# Patient Record
Sex: MALE | Race: WHITE | NOT HISPANIC OR LATINO | URBAN - METROPOLITAN AREA
[De-identification: names, ages, dates, MRNs, and addresses within clinical notes are randomized per-mention and may not be internally consistent; named-entity substitution may affect disease eponyms.]

---

## 2018-12-09 ENCOUNTER — PATIENT OUTREACH (OUTPATIENT)
Dept: HEALTH INFORMATION MANAGEMENT | Facility: OTHER | Age: 68
End: 2018-12-09

## 2018-12-12 ENCOUNTER — HOSPITAL ENCOUNTER (OUTPATIENT)
Facility: MEDICAL CENTER | Age: 68
End: 2018-12-12
Payer: MEDICARE

## 2018-12-12 PROCEDURE — 82274 ASSAY TEST FOR BLOOD FECAL: CPT

## 2018-12-15 LAB — HEMOCCULT STL QL IA: NEGATIVE

## 2021-03-03 DIAGNOSIS — Z23 NEED FOR VACCINATION: ICD-10-CM

## 2021-11-02 ENCOUNTER — TELEPHONE (OUTPATIENT)
Dept: HEALTH INFORMATION MANAGEMENT | Facility: OTHER | Age: 71
End: 2021-11-02

## 2022-03-03 ENCOUNTER — APPOINTMENT (OUTPATIENT)
Dept: RADIOLOGY | Facility: MEDICAL CENTER | Age: 72
End: 2022-03-03
Attending: EMERGENCY MEDICINE
Payer: MEDICARE

## 2022-03-03 ENCOUNTER — HOSPITAL ENCOUNTER (OUTPATIENT)
Facility: MEDICAL CENTER | Age: 72
End: 2022-03-04
Attending: EMERGENCY MEDICINE | Admitting: INTERNAL MEDICINE
Payer: MEDICARE

## 2022-03-03 DIAGNOSIS — R55 SYNCOPE, UNSPECIFIED SYNCOPE TYPE: ICD-10-CM

## 2022-03-03 PROBLEM — R42 VERTIGO: Status: ACTIVE | Noted: 2022-03-03

## 2022-03-03 PROBLEM — N40.0 BPH (BENIGN PROSTATIC HYPERPLASIA): Status: ACTIVE | Noted: 2022-03-03

## 2022-03-03 LAB
ALBUMIN SERPL BCP-MCNC: 4.5 G/DL (ref 3.2–4.9)
ALBUMIN/GLOB SERPL: 1.7 G/DL
ALP SERPL-CCNC: 91 U/L (ref 30–99)
ALT SERPL-CCNC: 22 U/L (ref 2–50)
ANION GAP SERPL CALC-SCNC: 10 MMOL/L (ref 7–16)
AST SERPL-CCNC: 26 U/L (ref 12–45)
BASOPHILS # BLD AUTO: 0.3 % (ref 0–1.8)
BASOPHILS # BLD: 0.02 K/UL (ref 0–0.12)
BILIRUB SERPL-MCNC: 0.5 MG/DL (ref 0.1–1.5)
BUN SERPL-MCNC: 20 MG/DL (ref 8–22)
CALCIUM SERPL-MCNC: 9.1 MG/DL (ref 8.5–10.5)
CHLORIDE SERPL-SCNC: 105 MMOL/L (ref 96–112)
CO2 SERPL-SCNC: 23 MMOL/L (ref 20–33)
CREAT SERPL-MCNC: 1.02 MG/DL (ref 0.5–1.4)
D DIMER PPP IA.FEU-MCNC: 3.48 UG/ML (FEU) (ref 0–0.5)
EKG IMPRESSION: NORMAL
EOSINOPHIL # BLD AUTO: 0.12 K/UL (ref 0–0.51)
EOSINOPHIL NFR BLD: 1.8 % (ref 0–6.9)
ERYTHROCYTE [DISTWIDTH] IN BLOOD BY AUTOMATED COUNT: 42.8 FL (ref 35.9–50)
GLOBULIN SER CALC-MCNC: 2.6 G/DL (ref 1.9–3.5)
GLUCOSE SERPL-MCNC: 142 MG/DL (ref 65–99)
HCT VFR BLD AUTO: 45.4 % (ref 42–52)
HGB BLD-MCNC: 14.5 G/DL (ref 14–18)
IMM GRANULOCYTES # BLD AUTO: 0.03 K/UL (ref 0–0.11)
IMM GRANULOCYTES NFR BLD AUTO: 0.4 % (ref 0–0.9)
LYMPHOCYTES # BLD AUTO: 1.65 K/UL (ref 1–4.8)
LYMPHOCYTES NFR BLD: 24.1 % (ref 22–41)
MCH RBC QN AUTO: 26.8 PG (ref 27–33)
MCHC RBC AUTO-ENTMCNC: 31.9 G/DL (ref 33.7–35.3)
MCV RBC AUTO: 83.9 FL (ref 81.4–97.8)
MONOCYTES # BLD AUTO: 0.45 K/UL (ref 0–0.85)
MONOCYTES NFR BLD AUTO: 6.6 % (ref 0–13.4)
NEUTROPHILS # BLD AUTO: 4.58 K/UL (ref 1.82–7.42)
NEUTROPHILS NFR BLD: 66.8 % (ref 44–72)
NRBC # BLD AUTO: 0 K/UL
NRBC BLD-RTO: 0 /100 WBC
PLATELET # BLD AUTO: 206 K/UL (ref 164–446)
PMV BLD AUTO: 8.9 FL (ref 9–12.9)
POTASSIUM SERPL-SCNC: 4.2 MMOL/L (ref 3.6–5.5)
PROT SERPL-MCNC: 7.1 G/DL (ref 6–8.2)
RBC # BLD AUTO: 5.41 M/UL (ref 4.7–6.1)
SODIUM SERPL-SCNC: 138 MMOL/L (ref 135–145)
TROPONIN T SERPL-MCNC: 7 NG/L (ref 6–19)
WBC # BLD AUTO: 6.9 K/UL (ref 4.8–10.8)

## 2022-03-03 PROCEDURE — 80053 COMPREHEN METABOLIC PANEL: CPT

## 2022-03-03 PROCEDURE — 70450 CT HEAD/BRAIN W/O DYE: CPT | Mod: MG

## 2022-03-03 PROCEDURE — 700105 HCHG RX REV CODE 258: Performed by: INTERNAL MEDICINE

## 2022-03-03 PROCEDURE — 304217 HCHG IRRIGATION SYSTEM

## 2022-03-03 PROCEDURE — 96374 THER/PROPH/DIAG INJ IV PUSH: CPT | Mod: XU

## 2022-03-03 PROCEDURE — 304999 HCHG REPAIR-SIMPLE/INTERMED LEVEL 1

## 2022-03-03 PROCEDURE — 93005 ELECTROCARDIOGRAM TRACING: CPT | Performed by: EMERGENCY MEDICINE

## 2022-03-03 PROCEDURE — A9270 NON-COVERED ITEM OR SERVICE: HCPCS | Performed by: NURSE PRACTITIONER

## 2022-03-03 PROCEDURE — 85025 COMPLETE CBC W/AUTO DIFF WBC: CPT

## 2022-03-03 PROCEDURE — G0378 HOSPITAL OBSERVATION PER HR: HCPCS

## 2022-03-03 PROCEDURE — 84484 ASSAY OF TROPONIN QUANT: CPT

## 2022-03-03 PROCEDURE — 99285 EMERGENCY DEPT VISIT HI MDM: CPT

## 2022-03-03 PROCEDURE — 99220 PR INITIAL OBSERVATION CARE,LEVL III: CPT | Performed by: INTERNAL MEDICINE

## 2022-03-03 PROCEDURE — 700117 HCHG RX CONTRAST REV CODE 255: Performed by: EMERGENCY MEDICINE

## 2022-03-03 PROCEDURE — 70498 CT ANGIOGRAPHY NECK: CPT | Mod: MG

## 2022-03-03 PROCEDURE — 700111 HCHG RX REV CODE 636 W/ 250 OVERRIDE (IP)

## 2022-03-03 PROCEDURE — 700101 HCHG RX REV CODE 250: Performed by: EMERGENCY MEDICINE

## 2022-03-03 PROCEDURE — 71045 X-RAY EXAM CHEST 1 VIEW: CPT

## 2022-03-03 PROCEDURE — 85379 FIBRIN DEGRADATION QUANT: CPT

## 2022-03-03 PROCEDURE — 700102 HCHG RX REV CODE 250 W/ 637 OVERRIDE(OP): Performed by: NURSE PRACTITIONER

## 2022-03-03 PROCEDURE — 36415 COLL VENOUS BLD VENIPUNCTURE: CPT

## 2022-03-03 PROCEDURE — 70496 CT ANGIOGRAPHY HEAD: CPT | Mod: MG

## 2022-03-03 PROCEDURE — 305308 HCHG STAPLER,SKIN,DISP.

## 2022-03-03 RX ORDER — ONDANSETRON 4 MG/1
4 TABLET, ORALLY DISINTEGRATING ORAL EVERY 4 HOURS PRN
Status: DISCONTINUED | OUTPATIENT
Start: 2022-03-03 | End: 2022-03-04 | Stop reason: HOSPADM

## 2022-03-03 RX ORDER — POLYETHYLENE GLYCOL 3350 17 G/17G
1 POWDER, FOR SOLUTION ORAL
Status: DISCONTINUED | OUTPATIENT
Start: 2022-03-03 | End: 2022-03-04 | Stop reason: HOSPADM

## 2022-03-03 RX ORDER — AMOXICILLIN 250 MG
2 CAPSULE ORAL 2 TIMES DAILY
Status: DISCONTINUED | OUTPATIENT
Start: 2022-03-03 | End: 2022-03-04 | Stop reason: HOSPADM

## 2022-03-03 RX ORDER — ONDANSETRON 2 MG/ML
4 INJECTION INTRAMUSCULAR; INTRAVENOUS EVERY 4 HOURS PRN
Status: DISCONTINUED | OUTPATIENT
Start: 2022-03-03 | End: 2022-03-04 | Stop reason: HOSPADM

## 2022-03-03 RX ORDER — FINASTERIDE 5 MG/1
5 TABLET, FILM COATED ORAL EVERY MORNING
Status: DISCONTINUED | OUTPATIENT
Start: 2022-03-04 | End: 2022-03-04 | Stop reason: HOSPADM

## 2022-03-03 RX ORDER — VITAMIN B COMPLEX
1000 TABLET ORAL EVERY MORNING
COMMUNITY

## 2022-03-03 RX ORDER — ACETAMINOPHEN 325 MG/1
650 TABLET ORAL EVERY 6 HOURS PRN
Status: DISCONTINUED | OUTPATIENT
Start: 2022-03-03 | End: 2022-03-04 | Stop reason: HOSPADM

## 2022-03-03 RX ORDER — FINASTERIDE 5 MG/1
5 TABLET, FILM COATED ORAL EVERY MORNING
COMMUNITY

## 2022-03-03 RX ORDER — ONDANSETRON 2 MG/ML
INJECTION INTRAMUSCULAR; INTRAVENOUS
Status: COMPLETED
Start: 2022-03-03 | End: 2022-03-03

## 2022-03-03 RX ORDER — LIDOCAINE HYDROCHLORIDE AND EPINEPHRINE BITARTRATE 20; .01 MG/ML; MG/ML
4 INJECTION, SOLUTION SUBCUTANEOUS ONCE
Status: COMPLETED | OUTPATIENT
Start: 2022-03-03 | End: 2022-03-03

## 2022-03-03 RX ORDER — LANOLIN ALCOHOL/MO/W.PET/CERES
1000 CREAM (GRAM) TOPICAL EVERY MORNING
COMMUNITY

## 2022-03-03 RX ORDER — TAMSULOSIN HYDROCHLORIDE 0.4 MG/1
0.4 CAPSULE ORAL EVERY MORNING
Status: DISCONTINUED | OUTPATIENT
Start: 2022-03-04 | End: 2022-03-04 | Stop reason: HOSPADM

## 2022-03-03 RX ORDER — VITAMIN B COMPLEX
1000 TABLET ORAL EVERY MORNING
Status: DISCONTINUED | OUTPATIENT
Start: 2022-03-03 | End: 2022-03-04 | Stop reason: HOSPADM

## 2022-03-03 RX ORDER — LIDOCAINE HYDROCHLORIDE AND EPINEPHRINE 10; 10 MG/ML; UG/ML
4 INJECTION, SOLUTION INFILTRATION; PERINEURAL ONCE
Status: DISCONTINUED | OUTPATIENT
Start: 2022-03-03 | End: 2022-03-03

## 2022-03-03 RX ORDER — ONDANSETRON 2 MG/ML
4 INJECTION INTRAMUSCULAR; INTRAVENOUS ONCE
Status: COMPLETED | OUTPATIENT
Start: 2022-03-03 | End: 2022-03-03

## 2022-03-03 RX ORDER — BISACODYL 10 MG
10 SUPPOSITORY, RECTAL RECTAL
Status: DISCONTINUED | OUTPATIENT
Start: 2022-03-03 | End: 2022-03-04 | Stop reason: HOSPADM

## 2022-03-03 RX ORDER — CHOLECALCIFEROL (VITAMIN D3) 125 MCG
1000 CAPSULE ORAL EVERY MORNING
Status: DISCONTINUED | OUTPATIENT
Start: 2022-03-04 | End: 2022-03-04 | Stop reason: HOSPADM

## 2022-03-03 RX ORDER — TAMSULOSIN HYDROCHLORIDE 0.4 MG/1
0.4 CAPSULE ORAL EVERY MORNING
COMMUNITY

## 2022-03-03 RX ORDER — SODIUM CHLORIDE 9 MG/ML
INJECTION, SOLUTION INTRAVENOUS CONTINUOUS
Status: DISCONTINUED | OUTPATIENT
Start: 2022-03-03 | End: 2022-03-04 | Stop reason: HOSPADM

## 2022-03-03 RX ADMIN — ACETAMINOPHEN 650 MG: 325 TABLET, FILM COATED ORAL at 22:20

## 2022-03-03 RX ADMIN — ONDANSETRON 4 MG: 2 INJECTION INTRAMUSCULAR; INTRAVENOUS at 10:36

## 2022-03-03 RX ADMIN — SODIUM CHLORIDE: 9 INJECTION, SOLUTION INTRAVENOUS at 18:09

## 2022-03-03 RX ADMIN — IOHEXOL 100 ML: 350 INJECTION, SOLUTION INTRAVENOUS at 13:30

## 2022-03-03 RX ADMIN — LIDOCAINE HYDROCHLORIDE AND EPINEPHRINE 4 ML: 20; 10 INJECTION, SOLUTION INFILTRATION; PERINEURAL at 10:45

## 2022-03-03 ASSESSMENT — ENCOUNTER SYMPTOMS
WEAKNESS: 0
DIZZINESS: 1
EYES NEGATIVE: 1
RESPIRATORY NEGATIVE: 1
GASTROINTESTINAL NEGATIVE: 1
CARDIOVASCULAR NEGATIVE: 1
CONSTITUTIONAL NEGATIVE: 1
SENSORY CHANGE: 0
MUSCULOSKELETAL NEGATIVE: 1
SPEECH CHANGE: 0
PSYCHIATRIC NEGATIVE: 1
HEADACHES: 0
FLANK PAIN: 0

## 2022-03-03 ASSESSMENT — COGNITIVE AND FUNCTIONAL STATUS - GENERAL
DRESSING REGULAR LOWER BODY CLOTHING: A LITTLE
SUGGESTED CMS G CODE MODIFIER MOBILITY: CJ
CLIMB 3 TO 5 STEPS WITH RAILING: A LITTLE
DAILY ACTIVITIY SCORE: 22
SUGGESTED CMS G CODE MODIFIER DAILY ACTIVITY: CJ
WALKING IN HOSPITAL ROOM: A LITTLE
HELP NEEDED FOR BATHING: A LITTLE
MOBILITY SCORE: 22

## 2022-03-03 ASSESSMENT — PAIN DESCRIPTION - PAIN TYPE
TYPE: ACUTE PAIN

## 2022-03-03 NOTE — ED NOTES
Pt sitting up @ this time. Pt became a little dizzy sitting up educated to take his time when doing this because he is at risk for falling with dizziness and syncope. Pt voices he understands. Work comp paper work being filed @ this time

## 2022-03-03 NOTE — ED PROVIDER NOTES
ED Provider Note    CHIEF COMPLAINT  No chief complaint on file.      HPI  Billy Baker is a 71 y.o. male who presents after a syncopal event.  He was standing outside with some friends and he suddenly passed out and struck the back of his head on the ground.  He has a laceration to the occiput of his head.  According to the friends he was with, the started CPR for about 30 seconds immediately after the patient fell to the ground.  Currently, the patient is alert and awake and oriented.  Has some chest discomfort where CPR was performed.  Also has some headache where his laceration is.  He feels rather well otherwise.  No recent fevers or illness.  No neck pain.  No numbness or weakness.  No trouble with his breathing.  Denies chronic anticoagulation beyond a baby aspirin.    REVIEW OF SYSTEMS  See HPI for further details. All other systems are negative.     PAST MEDICAL HISTORY       SOCIAL HISTORY  Social History     Tobacco Use   • Smoking status: Never Smoker   • Smokeless tobacco: Never Used   Substance and Sexual Activity   • Alcohol use: Yes   • Drug use: No   • Sexual activity: Not on file       SURGICAL HISTORY  patient denies any surgical history    CURRENT MEDICATIONS  Home Medications     Reviewed by Maritza Jha (Pharmacy Tech) on 03/03/22 at 1351  Med List Status: Complete   Medication Last Dose Status   aspirin EC (ECOTRIN) 81 MG Tablet Delayed Response 3/2/2022 Active   Cyanocobalamin (VITAMIN B-12) 1000 MCG Tab 3/2/2022 Active   finasteride (PROSCAR) 5 MG Tab 3/3/2022 Active   tamsulosin (FLOMAX) 0.4 MG capsule 3/3/2022 Active   vitamin D3 (CHOLECALCIFEROL) 1000 Unit (25 mcg) Tab 3/2/2022 Active                ALLERGIES  No Known Allergies    PHYSICAL EXAM  VITAL SIGNS: /67   Pulse 79   Temp 36.5 °C (97.7 °F) (Temporal)   Resp (!) 39   Wt 81.6 kg (179 lb 14.3 oz)   SpO2 93%   BMI 24.40 kg/m²   Pulse ox interpretation: I interpret this pulse ox as normal.  Constitutional: Alert in  no apparent distress.  HENT: Right occipital hematoma with 2 parallel lacerations, one approximately 2 cm in length and the other at approximately 3 cm in length, no active bleeding.  Bilateral external ears normal, Nose normal.   Eyes: Conjunctiva normal, Non-icteric.   Neck: Normal range of motion, Supple, No stridor.   Cardiovascular: Regular rate and rhythm.   Thorax & Lungs: Normal breath sounds, No respiratory distress, No wheezing, left anterior chest tenderness.   Abdomen: Bowel sounds normal, Soft, No masses, No pulsatile masses. No peritoneal signs.  Skin: Warm, Dry, No erythema, No rash.   Back: No midline back tenderness  Extremities: Intact distal pulses, No edema, No tenderness, No cyanosis  Musculoskeletal: Good range of motion in all major joints. No tenderness to palpation or major deformities noted.   Neurologic: Alert, Normal motor function and gait, Normal sensory function, No focal deficits noted.       DIAGNOSTIC STUDIES / PROCEDURES    EKG - Physician interpretation  Results for orders placed or performed during the hospital encounter of 22   EKG   Result Value Ref Range    Report       [facility] Emergency Dept.    Test Date:  2022  Pt Name:    DACIA CHANEY                   Department: ER  MRN:        [id number]                      Room:       BL 15  Gender:     Male                         Technician: [id number]  :        1950                   Requested By:KELLY PAYNE  Order #:    [id number]                    Reading MD: KELLY PAYNE MD    Measurements  Intervals                                Axis  Rate:       76                           P:          18  HI:         169                          QRS:        9  QRSD:       80                           T:          43  QT:         391  QTc:        440    Interpretive Statements  Sinus rhythm  Baseline wander in lead(s) V1,V2,V3,V4,V5,V6  No previous ECG available for comparison  Electronically Signed On 3-3-2022  11:07:50 PST by KELLY PAYNE MD         LABS  Labs Reviewed   CBC WITH DIFFERENTIAL - Abnormal; Notable for the following components:       Result Value    MCH 26.8 (*)     MCHC 31.9 (*)     MPV 8.9 (*)     All other components within normal limits   COMP METABOLIC PANEL - Abnormal; Notable for the following components:    Glucose 142 (*)     All other components within normal limits   TROPONIN   ESTIMATED GFR         RADIOLOGY  CT-CTA HEAD WITH & W/O-POST PROCESS   Final Result      No large vessel occlusion, high-grade stenosis or aneurysm of the [place].      CT-CTA NECK WITH & W/O-POST PROCESSING   Final Result      No high-grade stenosis, large vessel occlusion, aneurysm or dissection.      DX-CHEST-PORTABLE (1 VIEW)   Final Result      1.  No acute cardiac or pulmonary abnormalities are identified.      CT-HEAD W/O   Final Result      No CT evidence of acute infarct, hemorrhage or mass.           Laceration Repair Procedure Note    Indication: Laceration    Procedure: The patient was placed in the appropriate position and anesthesia around the laceration was obtained by infiltration using 2% Lidocaine with epinephrine. The area was then irrigated with normal saline. The laceration was closed with staples. A second laceration was closed with staples.     Total repaired wound length: 3 cm, 2cm    Other Items: Staple count: 6 total (4, 2 respectively)    The patient tolerated the procedure well.    Complications: None          COURSE & MEDICAL DECISION MAKING    Medications   lidocaine-epinephrine 2 %-1:[id number] injection 4 mL (4 mL Injection Given 3/3/22 1045)   ondansetron (ZOFRAN) syringe/vial injection 4 mg (4 mg Intravenous Given 3/3/22 1036)   iohexol (OMNIPAQUE) 350 mg/mL (IV) (100 mL Intravenous Given 3/3/22 1330)       Pertinent Labs & Imaging studies reviewed. (See chart for details)  71 y.o. male presenting after a syncopal event.  He has a slight prodrome and fall backwards and struck his head  on the ground.  Has 2 small lacerations [id number] without active bleeding.  Has a small hematoma.  CT was performed that was unremarkable for intracranial injury.  Wounds were irrigated and closed without complication using staples.    Laboratory studies were performed that were largely unremarkable.  EKG does not show evidence of arrhythmia.  Patient has chest discomfort where he received CPR by a bystander for about 30 seconds.  It is unclear if the patient's pulse was checked prior to initiating CPR.  Chest x-ray does not show pneumothorax or rib fractures.    Work-up was largely unremarkable to this point.  While considering discharging this patient home for further outpatient work-up, he became overwhelmingly the dizzy and unable to walk or stand independently.  Decision was made to perform CTA head and neck.  Concern for possible subarachnoid hemorrhage causing syncope, posterior circulation stroke causing vertigo, vertebral artery dissection given recent head trauma.  This too was found to be unremarkable.  I reevaluated the patient and he does report feeling improved overall.    I spoke with the hospitalist regarding hospitalization for observation due to syncope.  The hospitalist is agreeable to the hospitalization.      /67   Pulse 79   Temp 36.5 °C (97.7 °F) (Temporal)   Resp (!) 39   Wt 81.6 kg (179 lb 14.3 oz)   SpO2 93%   BMI 24.40 kg/m²         [facility], Emergency Dept  [address] [address] [phone]    As needed, If symptoms worsen    [facility], Emergency Dept  [address] [address] [phone]  In 1 week  For suture removal in 7-10 days    Primary care doctor    Schedule an appointment as soon as possible for a visit         FINAL IMPRESSION  1. Syncope, unspecified syncope type            Electronically signed by: Pepe Srivastava M.D., 3/3/2022 9:59 AM

## 2022-03-03 NOTE — H&P
Hospital Medicine History & Physical Note    Date of Service  3/3/2022    Primary Care Physician  Pcp Pt States None    Consultants  None    Code Status  Full Code    Chief Complaint  No chief complaint on file.      History of Presenting Illness    71-year-old male  with history of BPH and melanoma presented 3/3 with syncope, patient stated he felt dizzy and he lost his consciousness for seconds, he hit his head, he woke up and could not remember everything, patient denies significant chest pain no shortness of breath or palpitation, no significant vertigo however he has some dizzy, no nausea or vomiting and no other symptoms, patient did not have symptoms like that before and denied any heart disease before, patient was taken Flomax and finasteride for BPH and he states he did not eat today and he is not drinking enough water, on admission his vital signs were stable and blood pressure around 120/60, EKG did not show any erythema, troponin was 7, CTA for head and neck did not show any significant dissection or stenosis, chest x-ray did not show any signs of pulmonary edema or infarction, IV fluid was started.    I discussed the plan of care with patient, bedside RN and charge RN.    Review of Systems  Review of Systems   Constitutional: Negative.    HENT: Negative.    Eyes: Negative.    Respiratory: Negative.    Cardiovascular: Negative.    Gastrointestinal: Negative.    Genitourinary: Negative.  Negative for dysuria, flank pain, frequency and urgency.   Musculoskeletal: Negative.    Skin: Negative.    Neurological: Positive for dizziness. Negative for sensory change, speech change, weakness and headaches.   Psychiatric/Behavioral: Negative.        Past Medical History  BPH, melanoma      Surgical History  Removing melanoma from his back    Family History  Denied any significant family history of heart disease  Family history reviewed with patient. There is no family history that is pertinent to the chief  complaint.     Social History   reports that he has never smoked. He has never used smokeless tobacco. He reports current alcohol use. He reports that he does not use drugs.    Allergies  No Known Allergies    Medications  Prior to Admission Medications   Prescriptions Last Dose Informant Patient Reported? Taking?   Cyanocobalamin (VITAMIN B-12) 1000 MCG Tab 3/2/2022 at AM Patient Yes Yes   Sig: Take 1,000 mcg by mouth every morning.   aspirin EC (ECOTRIN) 81 MG Tablet Delayed Response 3/2/2022 at PM Patient Yes Yes   Sig: Take 81 mg by mouth every evening.   finasteride (PROSCAR) 5 MG Tab 3/3/2022 at 0530 Patient's Home Pharmacy Yes Yes   Sig: Take 5 mg by mouth every morning.   tamsulosin (FLOMAX) 0.4 MG capsule 3/3/2022 at 0530 Patient's Home Pharmacy Yes Yes   Sig: Take 0.4 mg by mouth every morning.   vitamin D3 (CHOLECALCIFEROL) 1000 Unit (25 mcg) Tab 3/2/2022 at AM Patient Yes Yes   Sig: Take 1,000 Units by mouth every morning.      Facility-Administered Medications: None       Physical Exam  Temp:  [36.4 °C (97.6 °F)-36.5 °C (97.7 °F)] 36.5 °C (97.7 °F)  Pulse:  [71-87] 87  Resp:  [14-39] 14  BP: (123-137)/(60-73) 124/73  SpO2:  [93 %-97 %] 94 %  Blood Pressure : 124/67   Temperature: 36.5 °C (97.7 °F)   Pulse: 79   Respiration: (!) 39   Pulse Oximetry: 93 %       Physical Exam  Constitutional:       Appearance: He is not ill-appearing.   HENT:      Head: Normocephalic and atraumatic.   Eyes:      General: No scleral icterus.  Cardiovascular:      Rate and Rhythm: Normal rate.      Heart sounds: No murmur heard.  Pulmonary:      Effort: No respiratory distress.      Breath sounds: No wheezing or rales.   Abdominal:      General: There is no distension.      Tenderness: There is no abdominal tenderness. There is no guarding.   Musculoskeletal:         General: No swelling or deformity.      Cervical back: No rigidity.      Right lower leg: No edema.      Left lower leg: No edema.   Lymphadenopathy:       Cervical: No cervical adenopathy.   Skin:     Coloration: Skin is not jaundiced.      Findings: No bruising or lesion.   Neurological:      General: No focal deficit present.      Mental Status: He is alert and oriented to person, place, and time. Mental status is at baseline.      Cranial Nerves: No cranial nerve deficit.      Sensory: No sensory deficit.      Motor: No weakness.      Coordination: Coordination normal.      Gait: Gait normal.      Deep Tendon Reflexes: Reflexes normal.   Psychiatric:         Mood and Affect: Mood normal.         Laboratory:  Recent Labs     03/03/22  1010   WBC 6.9   RBC 5.41   HEMOGLOBIN 14.5   HEMATOCRIT 45.4   MCV 83.9   MCH 26.8*   MCHC 31.9*   RDW 42.8   PLATELETCT 206   MPV 8.9*     Recent Labs     03/03/22  1010   SODIUM 138   POTASSIUM 4.2   CHLORIDE 105   CO2 23   GLUCOSE 142*   BUN 20   CREATININE 1.02   CALCIUM 9.1     Recent Labs     03/03/22  1010   ALTSGPT 22   ASTSGOT 26   ALKPHOSPHAT 91   TBILIRUBIN 0.5   GLUCOSE 142*         No results for input(s): NTPROBNP in the last 72 hours.      Recent Labs     03/03/22  1010   TROPONINT 7       Imaging:  CT-CTA HEAD WITH & W/O-POST PROCESS   Final Result      No large vessel occlusion, high-grade stenosis or aneurysm of the [place].      CT-CTA NECK WITH & W/O-POST PROCESSING   Final Result      No high-grade stenosis, large vessel occlusion, aneurysm or dissection.      DX-CHEST-PORTABLE (1 VIEW)   Final Result      1.  No acute cardiac or pulmonary abnormalities are identified.      CT-HEAD W/O   Final Result      No CT evidence of acute infarct, hemorrhage or mass.         EC-ECHOCARDIOGRAM COMPLETE W/O CONT    (Results Pending)       X-Ray:  I have personally reviewed the images and compared with prior images.  EKG:  I have personally reviewed the images and compared with prior images.    Assessment/Plan:  I anticipate this patient is appropriate for observation status at this time.    * Syncope and collapse-  (present on admission)  Assessment & Plan  With no symptoms of chest pain or palpitation  No arrhythmia on EKG  Troponin is normal  CTA for head and neck no stenosis  Telemetry monitor  Less likely to be stroke or PE, likely orthostatic and dehydration  We will order echo to evaluate his heart and continue IV fluid gently    BPH (benign prostatic hyperplasia)  Assessment & Plan  Continue finasteride and Flomax   Close monitoring and bladder scan if needed      VTE prophylaxis: SCDs/TEDs and enoxaparin ppx

## 2022-03-03 NOTE — ED NOTES
Attempted to get pt up to ambulate him. Pt is unable to stand for more than a few seconds d/t dizziness and feeling faint. ERP notified @ this time

## 2022-03-03 NOTE — ED NOTES
Pharmacy Medication Reconciliation      ~Medication reconciliation updated and complete per patient at bedside & patient home pharmacy  ~Allergies have been verified  ~No oral ABX within the last 30 days  ~Patient home pharmacy:[facility]

## 2022-03-04 ENCOUNTER — APPOINTMENT (OUTPATIENT)
Dept: CARDIOLOGY | Facility: MEDICAL CENTER | Age: 72
End: 2022-03-04
Attending: HOSPITALIST
Payer: MEDICARE

## 2022-03-04 VITALS
OXYGEN SATURATION: 96 % | TEMPERATURE: 98.6 F | BODY MASS INDEX: 24.37 KG/M2 | RESPIRATION RATE: 17 BRPM | WEIGHT: 179.9 LBS | HEIGHT: 72 IN | DIASTOLIC BLOOD PRESSURE: 78 MMHG | SYSTOLIC BLOOD PRESSURE: 115 MMHG | HEART RATE: 100 BPM

## 2022-03-04 LAB
LV EJECT FRACT  99904: 60
LV EJECT FRACT MOD 2C 99903: 62.89
LV EJECT FRACT MOD 4C 99902: 57.67
LV EJECT FRACT MOD BP 99901: 60.78

## 2022-03-04 PROCEDURE — G0378 HOSPITAL OBSERVATION PER HR: HCPCS

## 2022-03-04 PROCEDURE — 99217 PR OBSERVATION CARE DISCHARGE: CPT | Performed by: HOSPITALIST

## 2022-03-04 PROCEDURE — 700102 HCHG RX REV CODE 250 W/ 637 OVERRIDE(OP): Performed by: INTERNAL MEDICINE

## 2022-03-04 PROCEDURE — 93306 TTE W/DOPPLER COMPLETE: CPT

## 2022-03-04 PROCEDURE — A9270 NON-COVERED ITEM OR SERVICE: HCPCS | Performed by: INTERNAL MEDICINE

## 2022-03-04 PROCEDURE — 96372 THER/PROPH/DIAG INJ SC/IM: CPT

## 2022-03-04 PROCEDURE — 700111 HCHG RX REV CODE 636 W/ 250 OVERRIDE (IP): Performed by: INTERNAL MEDICINE

## 2022-03-04 PROCEDURE — 93306 TTE W/DOPPLER COMPLETE: CPT | Mod: 26 | Performed by: INTERNAL MEDICINE

## 2022-03-04 RX ADMIN — TAMSULOSIN HYDROCHLORIDE 0.4 MG: 0.4 CAPSULE ORAL at 05:14

## 2022-03-04 RX ADMIN — CYANOCOBALAMIN TAB 500 MCG 1000 MCG: 500 TAB at 05:14

## 2022-03-04 RX ADMIN — ENOXAPARIN SODIUM 40 MG: 40 INJECTION SUBCUTANEOUS at 05:14

## 2022-03-04 RX ADMIN — Medication 1000 UNITS: at 05:14

## 2022-03-04 RX ADMIN — FINASTERIDE 5 MG: 5 TABLET, FILM COATED ORAL at 05:14

## 2022-03-04 ASSESSMENT — PAIN DESCRIPTION - PAIN TYPE: TYPE: ACUTE PAIN

## 2022-03-04 NOTE — DISCHARGE SUMMARY
Discharge Summary    CHIEF COMPLAINT ON ADMISSION  No chief complaint on file.      Reason for Admission  TBI     Admission Date  3/3/2022    CODE STATUS  Full Code    HPI & HOSPITAL COURSE  This is a 71 y.o. male here with syncope. The patient was admitted and monitored on telemetry. He had a scalp laceration that was treated with stapling in the emergency department. His symptoms were mostly dizziness with standing up and it was felt that his syncope was likely related to orthostatic hypotension. The patient reported that he had been having poor oral intake as he was busy taking care of his wife after surgery. He was started on IV fluids for hydration. CT of the head was negative for acute pathology. CT of the head and neck was negative for high-grade stenosis or aneurysm. Echocardiogram was normal. On my evaluation this morning the patient was on room air as neurologic exam was intact. He denied any chest pain or lower extremity edema or dyspnea. Orthostatic vitals were negative this morning and did not have recurrence of dizziness with orthostatic vital check. His D-dimer is mildly elevated however he has no other symptoms concerning for PE and with a normal echocardiogram and no hypoxia discussed with the patient not proceeding with further work-up with CT of the chest and is in agreement with this plan of care.  Reviewed with the patient good hydration slow changes in positions and discuss if his orthostatic symptoms persist discussing stopping Flomax.  [facility] recheck and staple removal and for follow-up.        Therefore, he is discharged in good and stable condition to home with close outpatient follow-up.    The patient recovered much more quickly than anticipated on admission.    Discharge Date  3/4/2022    FOLLOW UP ITEMS POST DISCHARGE  Follow-up with PCP for staple removal and recheck next week    DISCHARGE DIAGNOSES  Principal  Problem:    Syncope and collapse POA: Yes  Active Problems:    BPH (benign prostatic hyperplasia) POA: Unknown  Resolved Problems:    * No resolved hospital problems. *      FOLLOW UP  No future appointments.  [facility], Emergency Dept  [address] [address] [phone]    As needed, If symptoms worsen    [facility], Emergency Dept  [address] [address] [phone]  In 1 week  For suture removal in 7-10 days    Primary care doctor    Schedule an appointment as soon as possible for a visit         MEDICATIONS ON DISCHARGE     Medication List      CONTINUE taking these medications      Instructions   aspirin EC 81 MG Tbec  Commonly known as: ECOTRIN   Take 81 mg by mouth every evening.  Dose: 81 mg     finasteride 5 MG Tabs  Commonly known as: PROSCAR   Take 5 mg by mouth every morning.  Dose: 5 mg     tamsulosin 0.4 MG capsule  Commonly known as: FLOMAX   Take 0.4 mg by mouth every morning.  Dose: 0.4 mg     vitamin B-12 1000 MCG Tabs   Take 1,000 mcg by mouth every morning.  Dose: 1,000 mcg     vitamin D3 1000 Unit (25 mcg) Tabs  Commonly known as: cholecalciferol   Take 1,000 Units by mouth every morning.  Dose: 1,000 Units            Allergies  No Known Allergies    DIET  Orders Placed This Encounter   Procedures   • Diet Order Diet: Cardiac     Standing Status:   Standing     Number of Occurrences:   1     Order Specific Question:   Diet:     Answer:   Cardiac [6]       ACTIVITY  As tolerated.  Weight bearing as tolerated        LABORATORY  Lab Results   Component Value Date    SODIUM 138 03/03/2022    POTASSIUM 4.2 03/03/2022    CHLORIDE 105 03/03/2022    CO2 23 03/03/2022    GLUCOSE 142 (H) 03/03/2022    BUN 20 03/03/2022    CREATININE 1.02 03/03/2022        Lab Results   Component Value Date    WBC 6.9 03/03/2022    HEMOGLOBIN 14.5 03/03/2022    HEMATOCRIT 45.4 03/03/2022    PLATELETCT 206 03/03/2022        Total time of the  discharge process exceeds 30 minutes.

## 2022-03-04 NOTE — DISCHARGE PLANNING
[facility]P TCN chart review completed. Collaborated with STALIN Manuel prior to meeting with the pt. The most current review of medical record, knowledge of pt's PLOF and social support, LACE+ score of 19, 6 clicks scores of 22 mobility were considered.      Pt seen at bedside. Introduced TCN program. Discussed HTH/SCP plan benefits including Meds to Beds, medical uber and [facility] transitional care services. Pt verbalizes understanding and appreciative of the informaition. There is no PCP on file, but end[facility] and plans to schedule f/u appt for staple removal from his laceration. Declines [facility]. Anticipate no additional TCN needs this admission, so will not actively follow, but can be reached via Voalte if needed. Thank you!

## 2022-03-04 NOTE — PROGRESS NOTES
4 Eyes Skin Assessment Completed by REMBERTO Meyer and REMBERTO Neal.    Head Swelling and Redness, laceration with staples  Ears WDL  Nose WDL  Mouth WDL  Neck WDL  Breast/Chest WDL  Shoulder Blades WDL  Spine WDL  (R) Arm/Elbow/Hand WDL  (L) Arm/Elbow/Hand WDL  Abdomen WDL  Groin WDL  Scrotum/Coccyx/Buttocks WDL  (R) Leg WDL  (L) Leg WDL  (R) Heel/Foot/Toe WDL  (L) Heel/Foot/Toe WDL          Devices In Places Tele Box      Interventions In Place N/A    Possible Skin Injury No    Pictures Uploaded Into Epic N/A  Wound Consult Placed N/A  RN Wound Prevention Protocol Ordered No

## 2022-03-04 NOTE — ASSESSMENT & PLAN NOTE
With no symptoms of chest pain or palpitation  No arrhythmia on EKG  Troponin is normal  CTA for head and neck no stenosis  Telemetry monitor  Less likely to be stroke or PE, likely orthostatic and dehydration  We will order echo to evaluate his heart and continue IV fluid gently

## 2022-03-04 NOTE — CARE PLAN
The patient is Watcher - Medium risk of patient condition declining or worsening    Shift Goals  Clinical Goals: safety,neurologically stable  Patient Goals: safety,sleep    Progress made toward(s) clinical / shift goals:  no syncopal episode,able to sleep  Problem: Knowledge Deficit - Standard  Goal: Patient and family/care givers will demonstrate understanding of plan of care, disease process/condition, diagnostic tests and medications  Outcome: Progressing     Problem: Pain - Standard  Goal: Alleviation of pain or a reduction in pain to the patient’s comfort goal  Outcome: Progressing       Patient is not progressing towards the following goals:

## 2022-03-05 NOTE — DISCHARGE INSTRUCTIONS
Discharge Instructions    Discharged to home by car with relative. Discharged via walking, hospital escort: Yes.  Special equipment needed: Not Applicable    Be sure to schedule a follow-up appointment with your primary care doctor or any specialists as instructed.     Discharge Plan:   Diet Plan: Discussed  Activity Level: Discussed  Confirmed Follow up Appointment: Patient to Call and Schedule Appointment  Confirmed Symptoms Management: Discussed  Medication Reconciliation Updated: Yes  Influenza Vaccine Indication: Not indicated: Previously immunized this influenza season and > 8 years of age    I understand that a diet low in cholesterol, fat, and sodium is recommended for good health. Unless I have been given specific instructions below for another diet, I accept this instruction as my diet prescription.   Other diet: Regular Diet    Special Instructions: None    · Is patient discharged on Warfarin / Coumadin?   No     Depression / Suicide Risk    As you are discharged from this [facility] facility, it is important to learn how to keep safe from harming yourself.    Recognize the warning signs:  · Abrupt changes in personality, positive or negative- including increase in energy   · Giving away possessions  · Change in eating patterns- significant weight changes-  positive or negative  · Change in sleeping patterns- unable to sleep or sleeping all the time   · Unwillingness or inability to communicate  · Depression  · Unusual sadness, discouragement and loneliness  · Talk of wanting to die  · Neglect of personal appearance   · Rebelliousness- reckless behavior  · Withdrawal from people/activities they love  · Confusion- inability to concentrate     If you or a loved one observes any of these behaviors or has concerns about self-harm, here's what you can do:  · Talk about it- your feelings and reasons for harming yourself  · Remove any means that you might use to hurt yourself (examples: pills, rope, extension  cords, firearm)  · Get professional help from the community (Mental Health, Substance Abuse, psychological counseling)  · Do not be alone:Call your Safe Contact- someone whom you trust who will be there for you.  · Call your local CRISIS HOTLINE 842-4556 or 290-881-2422  · Call your local Children's Mobile Crisis Response Team [place] (275) 111-5884 or www.[employer]  · Call the toll free National Suicide Prevention Hotlines   · National Suicide Prevention Lifeline 683-695-XPHE (2245)  · National Hope Line Network 800-SUICIDE (124-1308)

## 2022-03-10 ENCOUNTER — TELEPHONE (OUTPATIENT)
Dept: HEALTH INFORMATION MANAGEMENT | Facility: OTHER | Age: 72
End: 2022-03-10
Payer: MEDICARE

## 2022-06-27 ENCOUNTER — TELEPHONE (OUTPATIENT)
Dept: HEALTH INFORMATION MANAGEMENT | Facility: OTHER | Age: 72
End: 2022-06-27
Payer: MEDICARE

## 2023-08-01 ENCOUNTER — TELEPHONE (OUTPATIENT)
Dept: HEALTH INFORMATION MANAGEMENT | Facility: OTHER | Age: 73
End: 2023-08-01

## 2024-09-04 ENCOUNTER — PATIENT MESSAGE (OUTPATIENT)
Dept: HEALTH INFORMATION MANAGEMENT | Facility: OTHER | Age: 74
End: 2024-09-04